# Patient Record
Sex: MALE | Race: WHITE | NOT HISPANIC OR LATINO | URBAN - METROPOLITAN AREA
[De-identification: names, ages, dates, MRNs, and addresses within clinical notes are randomized per-mention and may not be internally consistent; named-entity substitution may affect disease eponyms.]

---

## 2021-06-24 ENCOUNTER — OFFICE VISIT (OUTPATIENT)
Dept: URBAN - METROPOLITAN AREA CLINIC 70 | Facility: CLINIC | Age: 66
End: 2021-06-24

## 2021-07-07 ENCOUNTER — OFFICE VISIT (OUTPATIENT)
Dept: URBAN - METROPOLITAN AREA CLINIC 70 | Facility: CLINIC | Age: 66
End: 2021-07-07
Payer: MEDICARE

## 2021-07-07 ENCOUNTER — LAB OUTSIDE AN ENCOUNTER (OUTPATIENT)
Dept: URBAN - METROPOLITAN AREA CLINIC 70 | Facility: CLINIC | Age: 66
End: 2021-07-07

## 2021-07-07 ENCOUNTER — WEB ENCOUNTER (OUTPATIENT)
Dept: URBAN - METROPOLITAN AREA CLINIC 70 | Facility: CLINIC | Age: 66
End: 2021-07-07

## 2021-07-07 DIAGNOSIS — F10.21 HISTORY OF ALCOHOLISM: ICD-10-CM

## 2021-07-07 DIAGNOSIS — R13.19 OTHER DYSPHAGIA: ICD-10-CM

## 2021-07-07 DIAGNOSIS — R63.4 UNEXPLAINED WEIGHT LOSS: ICD-10-CM

## 2021-07-07 PROBLEM — 161466001: Status: ACTIVE | Noted: 2021-07-07

## 2021-07-07 PROCEDURE — 99204 OFFICE O/P NEW MOD 45 MIN: CPT | Performed by: INTERNAL MEDICINE

## 2021-07-07 RX ORDER — CLOPIDOGREL BISULFATE 75 MG/1
1 TABLET TABLET ORAL ONCE A DAY
Status: ACTIVE | COMMUNITY

## 2021-07-07 RX ORDER — CARVEDILOL 3.12 MG/1
1 TABLET WITH FOOD TABLET, FILM COATED ORAL TWICE A DAY
Status: ACTIVE | COMMUNITY

## 2021-07-07 RX ORDER — PANTOPRAZOLE SODIUM 40 MG/1
1 TABLET TABLET, DELAYED RELEASE ORAL
Qty: 90 | Refills: 1 | OUTPATIENT
Start: 2021-07-07

## 2021-07-07 RX ORDER — ATORVASTATIN CALCIUM 40 MG/1
1 TABLET TABLET, FILM COATED ORAL ONCE A DAY
Status: ACTIVE | COMMUNITY

## 2021-07-07 NOTE — PHYSICAL EXAM CONSTITUTIONAL:
well developed, thin, in no acute distress , ambulating without difficulty with left side weakness, normal communication ability

## 2021-07-07 NOTE — HPI-TODAY'S VISIT:
Patient referred by [facility] for evalaution of unexplained weight loss.  A copy of this note will be sent to the referring provider.  Record states 16 pound weight loss over a 3 month period.  Patient reports normal weight as being around 165 pounds.  Feels weight loss started after his stroke in 2016.  Voices occasional dysphagia to pills and certain meats (like chicken).  Denies pain with swallowing or having to induce vomiting.  Denies abdominal pain, changes in bowel habits, nausea, vomiting, heartburn/indigestion or change in appetite.  Sister states that patient's portion sizes have been the same and that patient has always had small portion sizes.  Does have a history of alcohol consumption which he describes as a 1/2 gallon of whiskey lasting for a month.  Denies prior colonoscopy, EGD or recent imaging in regards to weight loss.  Voices family history of colon cancer in P. Uncle.  Labs from 5/26/21:  Hgb 14.7, MCV 95, ALT 12, AST 13, TB 0.2.   lbs

## 2022-09-16 ENCOUNTER — OFFICE VISIT (OUTPATIENT)
Dept: URBAN - METROPOLITAN AREA CLINIC 70 | Facility: CLINIC | Age: 67
End: 2022-09-16

## 2022-10-17 ENCOUNTER — OFFICE VISIT (OUTPATIENT)
Dept: URBAN - METROPOLITAN AREA CLINIC 70 | Facility: CLINIC | Age: 67
End: 2022-10-17

## 2023-02-09 ENCOUNTER — WEB ENCOUNTER (OUTPATIENT)
Dept: URBAN - METROPOLITAN AREA CLINIC 70 | Facility: CLINIC | Age: 68
End: 2023-02-09

## 2023-02-14 ENCOUNTER — OFFICE VISIT (OUTPATIENT)
Dept: URBAN - METROPOLITAN AREA CLINIC 70 | Facility: CLINIC | Age: 68
End: 2023-02-14

## 2023-02-14 RX ORDER — CARVEDILOL 3.12 MG/1
1 TABLET WITH FOOD TABLET, FILM COATED ORAL TWICE A DAY
Status: ACTIVE | COMMUNITY

## 2023-02-14 RX ORDER — ATORVASTATIN CALCIUM 40 MG/1
1 TABLET TABLET, FILM COATED ORAL ONCE A DAY
Status: ACTIVE | COMMUNITY

## 2023-02-14 RX ORDER — CLOPIDOGREL BISULFATE 75 MG/1
1 TABLET TABLET ORAL ONCE A DAY
Status: ACTIVE | COMMUNITY

## 2023-02-14 RX ORDER — PANTOPRAZOLE SODIUM 40 MG/1
1 TABLET TABLET, DELAYED RELEASE ORAL
Qty: 90 | Refills: 1 | Status: ACTIVE | COMMUNITY
Start: 2021-07-07

## 2023-02-27 ENCOUNTER — DASHBOARD ENCOUNTERS (OUTPATIENT)
Age: 68
End: 2023-02-27

## 2023-02-27 ENCOUNTER — LAB OUTSIDE AN ENCOUNTER (OUTPATIENT)
Dept: URBAN - METROPOLITAN AREA CLINIC 70 | Facility: CLINIC | Age: 68
End: 2023-02-27

## 2023-02-27 ENCOUNTER — OFFICE VISIT (OUTPATIENT)
Dept: URBAN - METROPOLITAN AREA CLINIC 70 | Facility: CLINIC | Age: 68
End: 2023-02-27
Payer: MEDICARE

## 2023-02-27 VITALS
DIASTOLIC BLOOD PRESSURE: 58 MMHG | WEIGHT: 114.8 LBS | SYSTOLIC BLOOD PRESSURE: 119 MMHG | OXYGEN SATURATION: 98 % | BODY MASS INDEX: 15.55 KG/M2 | HEIGHT: 72 IN | TEMPERATURE: 97.5 F | HEART RATE: 106 BPM

## 2023-02-27 DIAGNOSIS — R63.4 WEIGHT LOSS: ICD-10-CM

## 2023-02-27 DIAGNOSIS — Z12.11 COLON CANCER SCREENING: ICD-10-CM

## 2023-02-27 DIAGNOSIS — R13.10 DYSPHAGIA, UNSPECIFIED TYPE: ICD-10-CM

## 2023-02-27 PROBLEM — 40739000: Status: ACTIVE | Noted: 2023-02-27

## 2023-02-27 PROBLEM — 422868009 UNEXPLAINED WEIGHT LOSS: Status: ACTIVE | Noted: 2023-02-27

## 2023-02-27 PROBLEM — 305058001: Status: ACTIVE | Noted: 2023-02-27

## 2023-02-27 PROCEDURE — 99214 OFFICE O/P EST MOD 30 MIN: CPT

## 2023-02-27 RX ORDER — CLOPIDOGREL BISULFATE 75 MG/1
1 TABLET TABLET ORAL ONCE A DAY
Status: ACTIVE | COMMUNITY

## 2023-02-27 RX ORDER — PANTOPRAZOLE SODIUM 40 MG/1
1 TABLET TABLET, DELAYED RELEASE ORAL
Qty: 90 | Refills: 1 | Status: DISCONTINUED | COMMUNITY
Start: 2021-07-07

## 2023-02-27 RX ORDER — ATORVASTATIN CALCIUM 40 MG/1
1 TABLET TABLET, FILM COATED ORAL ONCE A DAY
Status: ACTIVE | COMMUNITY

## 2023-02-27 RX ORDER — CARVEDILOL 3.12 MG/1
1 TABLET WITH FOOD TABLET, FILM COATED ORAL TWICE A DAY
Status: ACTIVE | COMMUNITY

## 2023-02-27 NOTE — HPI-TODAY'S VISIT:
The pt presents for weight loss.  He has a hx of stroke and presents with his sister who provides most of the pt hx.  She states over the last year he has been losing weight (15-20 lbs in the last 6months).  She states that he does not eat much throughout the day and has never had a strong appetitie.  He denies abdominal pain with meals.  He does admit to occasional dysphagia.  He has never had an EGD or colonoscopy.  He denies a fhx of colon cancer.  Denies constiipation, diarrhea, or rectal bleeding. Had a recent chest CT which was normal per the pt's sister.  No abdominal imaging.  Admits to recent labs with his PCP over the last month.  Records requested.   Has a strong ETOH hx.  States he drinks about a half pint of liquour 2x a week.

## 2023-03-22 ENCOUNTER — OFFICE VISIT (OUTPATIENT)
Dept: URBAN - METROPOLITAN AREA SURGERY CENTER 24 | Facility: SURGERY CENTER | Age: 68
End: 2023-03-22